# Patient Record
Sex: FEMALE | Race: BLACK OR AFRICAN AMERICAN | NOT HISPANIC OR LATINO | Employment: STUDENT | URBAN - METROPOLITAN AREA
[De-identification: names, ages, dates, MRNs, and addresses within clinical notes are randomized per-mention and may not be internally consistent; named-entity substitution may affect disease eponyms.]

---

## 2019-02-28 ENCOUNTER — OFFICE VISIT (OUTPATIENT)
Dept: DERMATOLOGY | Facility: CLINIC | Age: 49
End: 2019-02-28
Payer: MEDICAID

## 2019-02-28 ENCOUNTER — TELEPHONE (OUTPATIENT)
Dept: DERMATOLOGY | Facility: CLINIC | Age: 49
End: 2019-02-28

## 2019-02-28 DIAGNOSIS — L81.9 DYSCHROMIA: ICD-10-CM

## 2019-02-28 DIAGNOSIS — L84 CALLUS OF FOOT: Primary | ICD-10-CM

## 2019-02-28 DIAGNOSIS — L90.5 SCARRING OF SKIN: ICD-10-CM

## 2019-02-28 DIAGNOSIS — L73.1 PSEUDOFOLLICULITIS BARBAE: Primary | ICD-10-CM

## 2019-02-28 PROCEDURE — 99203 PR OFFICE/OUTPT VISIT, NEW, LEVL III, 30-44 MIN: ICD-10-PCS | Mod: S$PBB,,, | Performed by: DERMATOLOGY

## 2019-02-28 PROCEDURE — 99203 OFFICE O/P NEW LOW 30 MIN: CPT | Mod: S$PBB,,, | Performed by: DERMATOLOGY

## 2019-02-28 PROCEDURE — 99999 PR PBB SHADOW E&M-NEW PATIENT-LVL III: CPT | Mod: PBBFAC,,, | Performed by: DERMATOLOGY

## 2019-02-28 PROCEDURE — 99999 PR PBB SHADOW E&M-NEW PATIENT-LVL III: ICD-10-PCS | Mod: PBBFAC,,, | Performed by: DERMATOLOGY

## 2019-02-28 PROCEDURE — 99203 OFFICE O/P NEW LOW 30 MIN: CPT | Mod: PBBFAC,PN | Performed by: DERMATOLOGY

## 2019-02-28 RX ORDER — LOSARTAN POTASSIUM AND HYDROCHLOROTHIAZIDE 12.5; 1 MG/1; MG/1
1 TABLET ORAL DAILY
COMMUNITY
End: 2020-08-03

## 2019-02-28 RX ORDER — MUPIROCIN 20 MG/G
OINTMENT TOPICAL 2 TIMES DAILY
COMMUNITY

## 2019-02-28 RX ORDER — SPIRONOLACTONE 100 MG/1
50 TABLET, FILM COATED ORAL DAILY
COMMUNITY
End: 2020-10-18

## 2019-02-28 RX ORDER — METOPROLOL SUCCINATE 25 MG/1
25 TABLET, EXTENDED RELEASE ORAL DAILY
COMMUNITY

## 2019-02-28 RX ORDER — CLINDAMYCIN PHOSPHATE 10 MG/G
GEL TOPICAL 2 TIMES DAILY
Qty: 30 G | Refills: 3 | Status: SHIPPED | OUTPATIENT
Start: 2019-02-28 | End: 2019-10-23 | Stop reason: SDUPTHER

## 2019-02-28 RX ORDER — HYDROQUINONE 40 MG/G
CREAM TOPICAL
Qty: 28 G | Refills: 1 | Status: SHIPPED | OUTPATIENT
Start: 2019-02-28

## 2019-02-28 RX ORDER — AMLODIPINE AND BENAZEPRIL HYDROCHLORIDE 5; 10 MG/1; MG/1
1 CAPSULE ORAL DAILY
COMMUNITY

## 2019-02-28 RX ORDER — METFORMIN HYDROCHLORIDE 500 MG/1
500 TABLET ORAL 2 TIMES DAILY WITH MEALS
COMMUNITY

## 2019-02-28 RX ORDER — FLUTICASONE PROPIONATE 50 MCG
1 SPRAY, SUSPENSION (ML) NASAL DAILY
COMMUNITY

## 2019-02-28 RX ORDER — BENZOYL PEROXIDE 100 MG/ML
LIQUID TOPICAL
Qty: 227 G | Refills: 12 | Status: SHIPPED | OUTPATIENT
Start: 2019-02-28 | End: 2020-04-27

## 2019-02-28 RX ORDER — LEVOTHYROXINE SODIUM 75 UG/1
75 TABLET ORAL DAILY
COMMUNITY
End: 2020-06-08

## 2019-02-28 NOTE — TELEPHONE ENCOUNTER
----- Message from Pricila Portillo MD sent at 2/28/2019  3:44 PM CST -----  Referral placed. She could try biotin 1712-3555 mcg daily and if related to menopause/hormonal, she could try OTC women's rogaine.  Will discuss further at f/u.    ----- Message -----  From: Shannon Mark LPN  Sent: 2/28/2019   1:49 PM  To: Pricila Portillo MD    Can you put the referral in for podiatry because they will have to get her scheduled. Also she was asking for recommendations for hair thinning.

## 2019-02-28 NOTE — PROGRESS NOTES
Subjective:       Patient ID:  Mechelle Cervantes is a 48 y.o. female who presents for   Chief Complaint   Patient presents with    Acne     c/o acne to chi x several yrs    Skin Check     UBSE,,c/o dry patches to upper body and face     History of Present Illness: The patient presents with chief complaint of acne.  She was previously seen by Dr. Wilder  Location: chin  Duration: several yrs  Signs/Symptoms: irritated    Prior treatments: spironolactone 50 mg qD, single blade shaving and tweezing    She also c/o dryness of the hands and feet since childhood    She shaves or tweezes daily        Review of Systems   Constitutional: Negative for fever and chills.   Gastrointestinal: Negative for nausea and vomiting.   Skin: Negative for daily sunscreen use, activity-related sunscreen use and recent sunburn.   Hematologic/Lymphatic: Does not bruise/bleed easily.        Objective:    Physical Exam   Constitutional: She appears well-developed and well-nourished. No distress.   Neurological: She is alert and oriented to person, place, and time. She is not disoriented.   Psychiatric: She has a normal mood and affect.   Skin:   Areas Examined (abnormalities noted in diagram):   Head / Face Inspection Performed  Neck Inspection Performed  Chest / Axilla Inspection Performed  Abdomen Inspection Performed  Back Inspection Performed  RUE Inspected  LUE Inspection Performed  RLE Inspected  LLE Inspection Performed  Nails and Digits Inspection Performed                       Diagram Legend     Open and closed comedones      Inflammatory papules and pustules       Assessment / Plan:        Pseudofolliculitis barbae  Dyschromia  Scarring of skin  -     benzoyl peroxide (BP WASH) 10 % external wash; Use daily as wash to affected areas. Rinse completely.  May bleach clothing  Dispense: 227 g; Refill: 12  -     clindamycin phosphate 1% (CLINDAGEL) 1 % gel; Apply topically 2 (two) times daily.  Dispense: 30 g; Refill: 3  -      hydroquinone 4 % Crea; Use once daily directly to dark spots.  Use sunscreen daily.  Dispense: 28 g; Refill: 1  -     Discussed dx, AVS given. Will start above med c/ [facility] 8% cream to dark spots. Discussed [facility].  Pt states spironolactone is not helping, offered to increase dose, pt defers.                Follow-up in about 3 months (around 5/28/2019).

## 2019-03-01 ENCOUNTER — TELEPHONE (OUTPATIENT)
Dept: DERMATOLOGY | Facility: CLINIC | Age: 49
End: 2019-03-01

## 2019-03-01 NOTE — TELEPHONE ENCOUNTER
----- Message from Rissa Alcocer sent at 3/1/2019 12:21 PM CST -----  Contact: pt  .Type:  Patient Returning Call    Who Called: pt  Who Left Message for Patient: nurse  Does the patient know what this is regarding?: pt isn't sure  Would the patient rather a call back or a response via [facility]?  Call back  Best Call Back Number: [phone] (home)     Additional Information:  pls call pt back

## 2019-03-01 NOTE — TELEPHONE ENCOUNTER
Informed pt of Dr. Portillo recommendations. Pt verbalized understanding and has no further questions at this time.

## 2019-10-23 DIAGNOSIS — L90.5 SCARRING OF SKIN: ICD-10-CM

## 2019-10-23 DIAGNOSIS — L81.9 DYSCHROMIA: ICD-10-CM

## 2019-10-23 DIAGNOSIS — L73.1 PSEUDOFOLLICULITIS BARBAE: ICD-10-CM

## 2019-10-23 RX ORDER — CLINDAMYCIN PHOSPHATE 10 MG/G
GEL TOPICAL
Qty: 30 G | Refills: 5 | Status: SHIPPED | OUTPATIENT
Start: 2019-10-23

## 2020-04-23 DIAGNOSIS — L73.1 PSEUDOFOLLICULITIS BARBAE: ICD-10-CM

## 2020-04-23 DIAGNOSIS — L81.9 DYSCHROMIA: ICD-10-CM

## 2020-04-23 DIAGNOSIS — L90.5 SCARRING OF SKIN: ICD-10-CM

## 2020-04-27 RX ORDER — BENZOYL PEROXIDE 100 MG/ML
LIQUID TOPICAL
Qty: 237 G | Refills: 3 | Status: SHIPPED | OUTPATIENT
Start: 2020-04-27